# Patient Record
Sex: MALE | URBAN - NONMETROPOLITAN AREA
[De-identification: names, ages, dates, MRNs, and addresses within clinical notes are randomized per-mention and may not be internally consistent; named-entity substitution may affect disease eponyms.]

---

## 2018-02-26 ENCOUNTER — APPOINTMENT (RX ONLY)
Dept: URBAN - NONMETROPOLITAN AREA CLINIC 22 | Facility: CLINIC | Age: 68
Setting detail: DERMATOLOGY
End: 2018-02-26

## 2018-02-26 DIAGNOSIS — L80 VITILIGO: ICD-10-CM

## 2018-02-26 PROBLEM — E03.9 HYPOTHYROIDISM, UNSPECIFIED: Status: ACTIVE | Noted: 2018-02-26

## 2018-02-26 PROBLEM — E13.9 OTHER SPECIFIED DIABETES MELLITUS WITHOUT COMPLICATIONS: Status: ACTIVE | Noted: 2018-02-26

## 2018-02-26 PROBLEM — I10 ESSENTIAL (PRIMARY) HYPERTENSION: Status: ACTIVE | Noted: 2018-02-26

## 2018-02-26 PROCEDURE — ? COUNSELING

## 2018-02-26 PROCEDURE — ? TREATMENT REGIMEN

## 2018-02-26 PROCEDURE — 99202 OFFICE O/P NEW SF 15 MIN: CPT

## 2018-02-26 ASSESSMENT — LOCATION SIMPLE DESCRIPTION DERM
LOCATION SIMPLE: RIGHT NOSE
LOCATION SIMPLE: LEFT HAND
LOCATION SIMPLE: LEFT NOSE
LOCATION SIMPLE: NOSE
LOCATION SIMPLE: LEFT FOREARM
LOCATION SIMPLE: RIGHT HAND
LOCATION SIMPLE: RIGHT WRIST
LOCATION SIMPLE: RIGHT FOREARM
LOCATION SIMPLE: LEFT WRIST

## 2018-02-26 ASSESSMENT — LOCATION DETAILED DESCRIPTION DERM
LOCATION DETAILED: LEFT VENTRAL WRIST
LOCATION DETAILED: COLUMELLA
LOCATION DETAILED: LEFT PROXIMAL RADIAL DORSAL FOREARM
LOCATION DETAILED: LEFT SOFT TRIANGLE OF THE NOSE
LOCATION DETAILED: LEFT RADIAL DORSAL HAND
LOCATION DETAILED: RIGHT PROXIMAL RADIAL DORSAL FOREARM
LOCATION DETAILED: RIGHT VENTRAL WRIST
LOCATION DETAILED: RIGHT SOFT TRIANGLE OF THE NOSE
LOCATION DETAILED: RIGHT RADIAL DORSAL HAND

## 2018-02-26 ASSESSMENT — LOCATION ZONE DERM
LOCATION ZONE: HAND
LOCATION ZONE: ARM
LOCATION ZONE: NOSE

## 2018-02-26 NOTE — PROCEDURE: TREATMENT REGIMEN
Continue Regimen: Previously prescribed: \\n- Tacrolimus 0.1% ointment
Plan: Discussed diagnosis with patient today informed patient that this condition is an autoimmune disease. Informed patient that vitiligo can also be related to thyroid problems. Informed patient that there is no cure for Vitiligo and treatment must be continuous because if it is stopped then the depigmentation will recur. Informed patient that treatment for Vitiligo has not changed in many years and treatment options include light box therapy, protopic and elidel. Informed patient that he may also try to get 20-30minutes of sunlight daily which will benefit like a light box. Informed patient that he is to use sunscreen after treatment with sunlight. Informed patient that his current treatment is the recommended regimen as would be prescribed today
Detail Level: Zone

## 2018-02-26 NOTE — HPI: DISCOLORATION (VITILIGO)
How Severe Is It?: mild
Is This A New Presentation, Or A Follow-Up?: Discoloration
Additional History: Patient states that he was previously on Triamcinolone Acetonide x 2 years. He states that he has also tried light box therapy x 2 cycles (1st time x 6 months, 2nd time completed 7 months last month).  Patient states that he is now here for further evaluation and treatment.

## 2018-05-01 NOTE — PROCEDURE: MIPS QUALITY
Quality 130: Documentation Of Current Medications In The Medical Record: Current Medications Documented
Action 1: Start
Detail Level: Detailed
Samples Given: Cetaphil, Vanicream, CeraVe
Action 4: Continue
Sig For Treatment 2 (If Needed): once daily
Sig For Treatment 1 (If Needed): once daily at bedtime
Treatment 1: Epiduo Forte
Plan: Follow up in 6-8 weeks
Treatment 2: minocycline
Detail Level: Zone